# Patient Record
(demographics unavailable — no encounter records)

---

## 2024-11-11 NOTE — ASSESSMENT
[FreeTextEntry1] : 31-year-old woman one week s/p Laparoscopic Sleeve Gastrectomy presents for follow-up. Patient doing well. Nutrition and exercise guidelines were reviewed with the patient.   Encourage zero calorie liquids (aim for 64 oz/day) 2 protein drinks/60 g protein per day; advance to soft food at 2 weeks post op Begin vitamins after today's visit (2 in the AM, and 2 in the PM) Do not submerge incision sites until 4 weeks post op. No heavy lifting until 6 weeks post op. Start Reltone/Ursodiol and continue until 6 months post op. for gallstone prevention during this period of rapid weight loss Increase cardio to 8,000 - 10,000 steps/day, track steps, and begin strength training at 6 weeks postop Follow-up with nutritionist Follow-up in 1 month via telehealth per pt preference Call with any questions or concerns   All questions answered

## 2024-11-11 NOTE — HISTORY OF PRESENT ILLNESS
[Procedure: ___] : Procedure performed: [unfilled]  [Date of Surgery: ___] : Date of Surgery:   [unfilled] [Surgeon Name:   ___] : Surgeon Name: Dr. RICH [Pre-Op Weight ___] : Pre-op weight was [unfilled] lbs [de-identified] : 31-year-old woman one week s/p Laparoscopic Sleeve Gastrectomy presents for follow-up. Patient doing well. Reports no leg cramps, fever/chills/sweats, nausea/vomiting, abdominal pain, diarrhea, constipation, nor reflux. Patient consuming 2 protein shakes per day, trying to drink adequate zero-calorie liquids per day (light urine), will start bariatric vitamins after today's visit, and is ambulating for exercise.   Opioids Used (Outpatient): total oxycodone taken by POD#7: 0 Morphine MSO4 equivalent: total at POD#7 0 MME Outpatient Pain Score at initial postop visit POD#7: pt reports pain level of 0, on pain scale of 0-10 Unused Opioids Returned: Discussed with the pt that unused opioids can be returned to the pt's local pharmacy Additional Opioids Provided: 0   Next nutritionist appointment is 12/18/24

## 2024-11-11 NOTE — PHYSICAL EXAM
[Normal] : affect appropriate [de-identified] : Incisions healing appropriately without erythema or drainage, soft, NT, ND, no evidence of hernia, Steri-Strips removed    [de-identified] : JAME

## 2024-11-11 NOTE — PHYSICAL EXAM
[Normal] : affect appropriate [de-identified] : Incisions healing appropriately without erythema or drainage, soft, NT, ND, no evidence of hernia, Steri-Strips removed    [de-identified] : JAME

## 2024-11-11 NOTE — HISTORY OF PRESENT ILLNESS
[Procedure: ___] : Procedure performed: [unfilled]  [Date of Surgery: ___] : Date of Surgery:   [unfilled] [Surgeon Name:   ___] : Surgeon Name: Dr. RICH [Pre-Op Weight ___] : Pre-op weight was [unfilled] lbs [de-identified] : 31-year-old woman one week s/p Laparoscopic Sleeve Gastrectomy presents for follow-up. Patient doing well. Reports no leg cramps, fever/chills/sweats, nausea/vomiting, abdominal pain, diarrhea, constipation, nor reflux. Patient consuming 2 protein shakes per day, trying to drink adequate zero-calorie liquids per day (light urine), will start bariatric vitamins after today's visit, and is ambulating for exercise.   Opioids Used (Outpatient): total oxycodone taken by POD#7: 0 Morphine MSO4 equivalent: total at POD#7 0 MME Outpatient Pain Score at initial postop visit POD#7: pt reports pain level of 0, on pain scale of 0-10 Unused Opioids Returned: Discussed with the pt that unused opioids can be returned to the pt's local pharmacy Additional Opioids Provided: 0   Next nutritionist appointment is 12/18/24

## 2024-12-16 NOTE — REASON FOR VISIT
[Post Operative Visit] : a post operative visit for [S/P Bariatric Surgery] : s/p bariatric surgery [Home] : at home, [unfilled] , at the time of the visit. [Medical Office: ([facility])___] : at the medical office located in  [Patient] : the patient

## 2024-12-16 NOTE — ASSESSMENT
[FreeTextEntry1] : 31-year-old woman over 6 weeks s/p Laparoscopic Sleeve Gastrectomy presents for follow-up. Patient doing well.  Patient doing well. Nutrition and exercise guidelines were reviewed with the patient.   Continue 3 protein-focused meals/day (aim for 70 g protein/day); continue soft foods until 6 weeks post op. Encourage zero calorie fluid intake (64 oz/day) Continue bariatric vitamins; can switch to capsule vitamins after today's visit (vitamin list given to pt) Continue MiraLAX or Colace PRN for constipation  Exercise with cardio (aim for 8k-10k steps/day), and start strength training 2x/week at 6 weeks post op. Use step counter No heavy lifting until 6 weeks post op. Continue Reltone/Ursodiol until 6 months post op. for gallstone prevention during this period of rapid weight loss? Weigh yourself 1x-2x/week Follow-up with nutritionist (keep a food log) Follow-up in 6 weeks with labs (ordered; try to complete 1-2 weeks prior to next visit) All questions answered   Call with any questions or concerns

## 2024-12-16 NOTE — REASON FOR VISIT
[Post Operative Visit] : a post operative visit for [S/P Bariatric Surgery] : s/p bariatric surgery [Home] : at home, [unfilled] , at the time of the visit. [Medical Office: ([facility])___] : at the medical office located in  [Patient] : the patient

## 2024-12-16 NOTE — HISTORY OF PRESENT ILLNESS
[Procedure: ___] : Procedure performed: [unfilled]  [Date of Surgery: ___] : Date of Surgery:   [unfilled] [Surgeon Name:   ___] : Surgeon Name: Dr. RICH [Pre-Op Weight ___] : Pre-op weight was [unfilled] lbs [de-identified] : 31-year-old woman over 6 weeks s/p Laparoscopic Sleeve Gastrectomy presents for follow-up. Patient doing well. Reports constipation (taking MiraLAX); reports no abdominal pain, nausea/vomiting, diarrhea, reflux/heartburn. Patient eating 3 protein-rich meals/day, drinking adequate zero-calorie fluids/day, taking bariatric vitamins, and walking 10k steps/day.   Next nutritionist appointment is 12/18/24

## 2024-12-16 NOTE — HISTORY OF PRESENT ILLNESS
[Procedure: ___] : Procedure performed: [unfilled]  [Date of Surgery: ___] : Date of Surgery:   [unfilled] [Surgeon Name:   ___] : Surgeon Name: Dr. RICH [Pre-Op Weight ___] : Pre-op weight was [unfilled] lbs [de-identified] : 31-year-old woman over 6 weeks s/p Laparoscopic Sleeve Gastrectomy presents for follow-up. Patient doing well. Reports constipation (taking MiraLAX); reports no abdominal pain, nausea/vomiting, diarrhea, reflux/heartburn. Patient eating 3 protein-rich meals/day, drinking adequate zero-calorie fluids/day, taking bariatric vitamins, and walking 10k steps/day.   Next nutritionist appointment is 12/18/24

## 2025-02-03 NOTE — HISTORY OF PRESENT ILLNESS
[de-identified] : 31-year-old woman 3 months s/p Laparoscopic Sleeve Gastrectomy presents for follow-up. Patient doing well. Reports constipation (having BM daily - taking stool softener); reports no abdominal pain, nausea/vomiting, diarrhea, reflux/heartburn. Patient eating 3 protein-rich meals/day, drinking adequate zero-calorie fluids/day, taking bariatric vitamins, and walking 10k steps/day.   Next nutritionist appointment is 2/21/25 [Procedure: ___] : Procedure performed: [unfilled]  [Date of Surgery: ___] : Date of Surgery:   [unfilled] [Surgeon Name:   ___] : Surgeon Name: Dr. RICH [Pre-Op Weight ___] : Pre-op weight was [unfilled] lbs

## 2025-02-03 NOTE — REVIEW OF SYSTEMS
[Patient Intake Form Reviewed] : Patient intake form was reviewed [Abdominal Pain] : no abdominal pain [Vomiting] : no vomiting [Constipation] : constipation [Diarrhea] : no diarrhea [Reflux/Heartburn] : no reflux/ heartburn [Hernia] : no hernia [Negative] : Allergic/Immunologic

## 2025-02-03 NOTE — PHYSICAL EXAM
[Normal] : affect appropriate [de-identified] : Equal chest rise, non-labored respirations, no audible wheezing.